# Patient Record
Sex: MALE | Race: WHITE | NOT HISPANIC OR LATINO | Employment: FULL TIME | URBAN - METROPOLITAN AREA
[De-identification: names, ages, dates, MRNs, and addresses within clinical notes are randomized per-mention and may not be internally consistent; named-entity substitution may affect disease eponyms.]

---

## 2024-09-06 ENCOUNTER — OFFICE VISIT (OUTPATIENT)
Dept: ORTHOPEDIC SURGERY | Facility: CLINIC | Age: 39
End: 2024-09-06
Payer: COMMERCIAL

## 2024-09-06 VITALS
HEART RATE: 60 BPM | SYSTOLIC BLOOD PRESSURE: 135 MMHG | HEIGHT: 69 IN | DIASTOLIC BLOOD PRESSURE: 87 MMHG | BODY MASS INDEX: 27.99 KG/M2 | WEIGHT: 189 LBS

## 2024-09-06 DIAGNOSIS — M25.861 KNEE JOINT CYST, RIGHT: Primary | ICD-10-CM

## 2024-09-06 DIAGNOSIS — M25.561 RIGHT KNEE PAIN, UNSPECIFIED CHRONICITY: ICD-10-CM

## 2024-09-06 RX ORDER — TRIAMCINOLONE ACETONIDE 40 MG/ML
40 INJECTION, SUSPENSION INTRA-ARTICULAR; INTRAMUSCULAR
Status: COMPLETED | OUTPATIENT
Start: 2024-09-06 | End: 2024-09-06

## 2024-09-06 RX ORDER — LIDOCAINE HYDROCHLORIDE 10 MG/ML
1 INJECTION, SOLUTION EPIDURAL; INFILTRATION; INTRACAUDAL; PERINEURAL
Status: COMPLETED | OUTPATIENT
Start: 2024-09-06 | End: 2024-09-06

## 2024-09-06 RX ORDER — HYDROXYZINE HYDROCHLORIDE 25 MG/1
TABLET, FILM COATED ORAL
COMMUNITY

## 2024-09-06 RX ADMIN — TRIAMCINOLONE ACETONIDE 40 MG: 40 INJECTION, SUSPENSION INTRA-ARTICULAR; INTRAMUSCULAR at 19:29

## 2024-09-06 RX ADMIN — LIDOCAINE HYDROCHLORIDE 1 ML: 10 INJECTION, SOLUTION EPIDURAL; INFILTRATION; INTRACAUDAL; PERINEURAL at 19:29

## 2024-09-06 NOTE — PROGRESS NOTES
"Subjective:     Patient ID: Estuardo Harden is a 39 y.o. male.    Chief Complaint:  Swelling right knee, new patient to examiner  History of Present Illness  History of Present Illness    Estuardo Harden presents to clinic today with a 2-week history of swelling along the medial aspect of the right knee.  He was struck by a baseball at the medial aspect of the knee did note significant swelling and bruising now he is left with a palpable knot that does increase with discomfort with palpation he is not experiencing any pain with extension or flexion of the knee denies any knee instability but very palpable swelling along the medial aspect of the knee denies any prior injury to the knee in the past he is able to ambulate and complete activities of daily living he is planning to play baseball this weekend with the knee.  Not currently taking any consistent medications for symptom relief.  Denies any other concerns present.       Social History     Occupational History    Not on file   Tobacco Use    Smoking status: Never     Passive exposure: Never    Smokeless tobacco: Never   Vaping Use    Vaping status: Never Used   Substance and Sexual Activity    Alcohol use: Not Currently    Drug use: Not Currently    Sexual activity: Defer      History reviewed. No pertinent past medical history.  History reviewed. No pertinent surgical history.    History reviewed. No pertinent family history.            Objective:  Physical Exam    Vital signs reviewed.   General: No acute distress.  Eyes: conjunctiva clear; pupils equally round and reactive  ENT: external ears and nose atraumatic; oropharynx clear  CV: no peripheral edema  Resp: normal respiratory effort  Skin: no rashes or wounds; normal turgor  Psych: mood and affect appropriate; recent and remote memory intact    Vitals:    09/06/24 1116   BP: 135/87   Pulse: 60   Weight: 85.7 kg (189 lb)   Height: 175.3 cm (69\")         09/06/24  1116   Weight: 85.7 kg (189 lb)     Body mass " index is 27.91 kg/m².      Right Knee Exam     Tenderness   The patient is experiencing tenderness in the medial joint line.    Range of Motion   Extension:  0   Right knee flexion: 130.     Tests   Jason:  Medial - negative Lateral - negative  Varus: negative Valgus: negative  Lachman:  Anterior - negative      Drawer:  Anterior - negative      Patellar apprehension: negative    Other   Erythema: absent  Sensation: normal  Pulse: present  Swelling: none    Comments:  Palpable cystic structure along the medial aspect of the knee joint, mobile             Physical Exam      Imaging:  Right Knee X-Ray  Indication: Pain    AP, Lateral, and [place] views    Findings:  No fracture  No bony lesion  Normal soft tissues  Normal joint spaces    No prior studies were available for comparison.    Assessment:        1. Right knee pain, unspecified chronicity    2. Knee joint cyst, right         Assessment & Plan      Plan:  Discussed plan of care with patient.  We did discuss treatment options including proceeding with oral oral steroids, uncertain of symptom improvement since there is a palpable structure we did opt to proceed with aspiration corticosteroid injection along the cystic structure of the right knee we did obtain at least 8 mL of fluid 6 with the aspiration and further fluid expelled after aspiration completed he did tolerate procedure well I do recommend application of ice injection site this evening he did note immediate symptom improvement with deep flexion and extension of the knee.  I do recommend he continue with application of ice.  We did discuss play tomorrow would be fine as long as he is able to tolerate encouraged him to watch for any erythema any streaking any worsening of fluid production any worsening of pain to call immediately again minimal risk about 1 him to be aware we will gladly see him back in clinic as needed all questions answered.   Orders:  Orders Placed This Encounter   Procedures     XR Knee 3 View Right     No orders of the defined types were placed in this encounter.  Large Joint Arthrocentesis: R knee  Date/Time: 9/6/2024 7:29 PM  Consent given by: patient  Site marked: site marked  Timeout: Immediately prior to procedure a time out was called to verify the correct patient, procedure, equipment, support staff and site/side marked as required   Supporting Documentation  Indications: pain   Procedure Details  Location: knee - R knee  Preparation: Patient was prepped and draped in the usual sterile fashion  Needle size: 18 G  Approach: medially.  Medications administered: 40 mg triamcinolone acetonide 40 MG/ML; 1 mL lidocaine PF 1% 1 %  Aspirate amount: 6 mL  Aspirate: serous and blood-tinged  Patient tolerance: patient tolerated the procedure well with no immediate complications      Dragon dictation utilized